# Patient Record
Sex: MALE | URBAN - METROPOLITAN AREA
[De-identification: names, ages, dates, MRNs, and addresses within clinical notes are randomized per-mention and may not be internally consistent; named-entity substitution may affect disease eponyms.]

---

## 2021-06-10 ENCOUNTER — OFFICE VISIT (OUTPATIENT)
Dept: URBAN - METROPOLITAN AREA CLINIC 71 | Facility: CLINIC | Age: 76
End: 2021-06-10
Payer: COMMERCIAL

## 2021-06-10 DIAGNOSIS — H52.4 PRESBYOPIA: Primary | ICD-10-CM

## 2021-06-10 PROCEDURE — 92004 COMPRE OPH EXAM NEW PT 1/>: CPT | Performed by: OPTOMETRIST

## 2021-06-10 ASSESSMENT — VISUAL ACUITY
OD: 20/25
OS: 20/80

## 2021-06-10 ASSESSMENT — INTRAOCULAR PRESSURE
OS: 12
OD: 13

## 2021-06-10 NOTE — IMPRESSION/PLAN
Impression: Presbyopia: H52.4. Plan: Presbyopia is the inability to focus on objects (ie: accommodate) due to the loss of flexibility of your natural lens. Presbyopia occurs with age. Reading glasses, bifocals, trifocals or contacts can be helpful. Contact the office if difficulty focusing persists despite corrective eye wear. New glasses for progressives and NVO given today. Continue to follow annually for vision exams.

## 2022-01-04 ENCOUNTER — OFFICE VISIT (OUTPATIENT)
Dept: URBAN - METROPOLITAN AREA CLINIC 71 | Facility: CLINIC | Age: 77
End: 2022-01-04
Payer: COMMERCIAL

## 2022-01-04 DIAGNOSIS — H43.813 VITREOUS DEGENERATION, BILATERAL: ICD-10-CM

## 2022-01-04 DIAGNOSIS — H35.3111 NONEXUDATIVE AGE-RELATED MACULAR DEGENERATION OF RIGHT EYE, EARLY DRY STAGE: Primary | ICD-10-CM

## 2022-01-04 DIAGNOSIS — H25.13 AGE-RELATED NUCLEAR CATARACT, BILATERAL: ICD-10-CM

## 2022-01-04 DIAGNOSIS — H35.349 MACULAR HOLE: ICD-10-CM

## 2022-01-04 PROCEDURE — 99214 OFFICE O/P EST MOD 30 MIN: CPT | Performed by: OPTOMETRIST

## 2022-01-04 PROCEDURE — 92134 CPTRZ OPH DX IMG PST SGM RTA: CPT | Performed by: OPTOMETRIST

## 2022-01-04 ASSESSMENT — INTRAOCULAR PRESSURE
OS: 12
OD: 10

## 2022-01-04 NOTE — IMPRESSION/PLAN
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 Plan: OCT ordered and performed today. There are no specific modalities proven to be effective to treat drusen. A diet rich in green vegetables is recommended. It is important to follow the amsler grid and report any changes such as increase in distortion or blurring of lines. If drusen progress they can cause decrease in vision or distortion of vision. Avoidance of smoking is recommended. Contact the office if you experience decrease in vision, blurred vision, distoriton in vision or concerning changes in vision. Take eye vitamins and check amsler grid daily.

## 2022-01-04 NOTE — IMPRESSION/PLAN
Impression: Vitreous degeneration, bilateral: H43.813. Stable. No holes or tears observed on today's exam. Plan: There is no evidence of retinal pathology. All signs and risks of retinal detachment and tears were discussed in detail. Patient instructed to call the office immediately if any symptoms noted.

## 2022-01-04 NOTE — IMPRESSION/PLAN
Impression: Macular hole: H35.349. Left. Old, pt reports 4+ years. Plan: Discussed in detail with pt. Continue to monitor. Call with new symptoms or concerns.

## 2022-07-07 ENCOUNTER — OFFICE VISIT (OUTPATIENT)
Dept: URBAN - METROPOLITAN AREA CLINIC 71 | Facility: CLINIC | Age: 77
End: 2022-07-07
Payer: COMMERCIAL

## 2022-07-07 DIAGNOSIS — H25.13 AGE-RELATED NUCLEAR CATARACT, BILATERAL: ICD-10-CM

## 2022-07-07 DIAGNOSIS — H35.349 MACULAR HOLE: ICD-10-CM

## 2022-07-07 DIAGNOSIS — H35.3131 NONEXUDATIVE AGE-RELATED MACULAR DEGENERATION, BILATERAL, EARLY DRY STAGE: Primary | ICD-10-CM

## 2022-07-07 DIAGNOSIS — H52.4 PRESBYOPIA: ICD-10-CM

## 2022-07-07 PROCEDURE — 99214 OFFICE O/P EST MOD 30 MIN: CPT | Performed by: OPTOMETRIST

## 2022-07-07 PROCEDURE — 92134 CPTRZ OPH DX IMG PST SGM RTA: CPT | Performed by: OPTOMETRIST

## 2022-07-07 ASSESSMENT — INTRAOCULAR PRESSURE
OS: 17
OD: 17

## 2022-07-07 ASSESSMENT — VISUAL ACUITY
OD: 20/40
OS: 20/150

## 2022-07-07 ASSESSMENT — KERATOMETRY
OD: 40.00
OS: 40.63

## 2022-07-07 NOTE — IMPRESSION/PLAN
Impression: Presbyopia: H52.4. Plan: Presbyopia is the inability to focus on objects (ie: accommodate) due to the loss of flexibility of your natural lens. Presbyopia occurs with age. Reading glasses, bifocals, trifocals or contacts can be helpful. Contact the office if difficulty focusing persists despite corrective eye wear. New glasses RX given today for NVO. Discussed there is not much improvement with updating the glasses but there has been a slight change in the prescription.

## 2022-07-07 NOTE — IMPRESSION/PLAN
Impression: Macular hole: H35.349 Left. Plan: Macular hole w/ Microcystic edema noted on exam and OCT today. There has been a decline in his acuity from 20/80 to 20/400. Referring PT to retina for evaluation.

## 2022-08-11 ENCOUNTER — OFFICE VISIT (OUTPATIENT)
Facility: LOCATION | Age: 77
End: 2022-08-11
Payer: COMMERCIAL

## 2022-08-11 DIAGNOSIS — H17.9 CORNEAL SCAR: ICD-10-CM

## 2022-08-11 DIAGNOSIS — H43.813 VITREOUS DEGENERATION, BILATERAL: ICD-10-CM

## 2022-08-11 DIAGNOSIS — H25.13 AGE-RELATED NUCLEAR CATARACT, BILATERAL: ICD-10-CM

## 2022-08-11 DIAGNOSIS — H35.342 MACULAR CYST, HOLE, OR PSEUDOHOLE, LEFT EYE: Primary | ICD-10-CM

## 2022-08-11 DIAGNOSIS — H35.3131 NONEXUDATIVE AGE-RELATED MACULAR DEGENERATION, BILATERAL, EARLY DRY STAGE: ICD-10-CM

## 2022-08-11 PROCEDURE — 99204 OFFICE O/P NEW MOD 45 MIN: CPT | Performed by: OPHTHALMOLOGY

## 2022-08-11 PROCEDURE — 92134 CPTRZ OPH DX IMG PST SGM RTA: CPT | Performed by: OPHTHALMOLOGY

## 2022-08-11 ASSESSMENT — INTRAOCULAR PRESSURE
OS: 14
OD: 15

## 2022-08-11 NOTE — IMPRESSION/PLAN
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 OCT OU - no IRF/SRF OU, FTMH OS  / 248 Plan: Onset per patient was 5 years ago Has chronic pigment surrounding MH Given chronicity, unlikely to benefit from surgery, but could try RBA's d/w patient in detail 12m OCT OU with us or Dr. Joshua Rehman

## 2022-08-11 NOTE — IMPRESSION/PLAN
Impression: Nonexudative age-related macular degeneration, bilateral, early dry stage: H35.3131. Plan: Discussed diagnosis with patient. No smoking. Pt to take vitamins approved in the AREDS2 study & continue to monitor AG on a daily basis. If any changes w/ AG call and RTC ASAP.

## 2023-07-18 ENCOUNTER — OFFICE VISIT (OUTPATIENT)
Dept: URBAN - METROPOLITAN AREA CLINIC 71 | Facility: CLINIC | Age: 78
End: 2023-07-18
Payer: MEDICARE

## 2023-07-18 DIAGNOSIS — H25.13 AGE-RELATED NUCLEAR CATARACT, BILATERAL: ICD-10-CM

## 2023-07-18 DIAGNOSIS — H52.4 PRESBYOPIA: ICD-10-CM

## 2023-07-18 DIAGNOSIS — H35.342 MACULAR CYST, HOLE, OR PSEUDOHOLE, LEFT EYE: Primary | ICD-10-CM

## 2023-07-18 DIAGNOSIS — H35.3131 NONEXUDATIVE AGE-RELATED MACULAR DEGENERATION, BILATERAL, EARLY DRY STAGE: ICD-10-CM

## 2023-07-18 DIAGNOSIS — H17.9 CORNEAL SCAR: ICD-10-CM

## 2023-07-18 DIAGNOSIS — H43.813 VITREOUS DEGENERATION, BILATERAL: ICD-10-CM

## 2023-07-18 PROCEDURE — 99213 OFFICE O/P EST LOW 20 MIN: CPT | Performed by: OPTOMETRIST

## 2023-07-18 PROCEDURE — 92134 CPTRZ OPH DX IMG PST SGM RTA: CPT | Performed by: OPTOMETRIST

## 2023-07-18 ASSESSMENT — INTRAOCULAR PRESSURE
OD: 14
OS: 16

## 2023-07-18 ASSESSMENT — VISUAL ACUITY
OD: 20/25
OS: 20/400

## 2023-07-18 NOTE — IMPRESSION/PLAN
Impression: Age-related nuclear cataract, bilateral: H25.13. Plan: No treatment recommended at this time, recommend waiting as long as possible due to OD being only eye that sees well.  Will continue to monitor

## 2023-07-18 NOTE — IMPRESSION/PLAN
Impression: Nonexudative age-related macular degeneration, bilateral, early dry stage: H35.3131. OCT 7/18/2023: NO IRF/SRF Plan: Discussed. Continue taking eye vitamins and checking amsler grid. Contact office if any changes in vision.  Will continue to monitor annually

## 2023-07-18 NOTE — IMPRESSION/PLAN
Impression: Vitreous degeneration, bilateral: H43.813. Plan: Contact office if any new or worsening symptoms.  Monitor annually

## 2023-07-18 NOTE — IMPRESSION/PLAN
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 OCT 7/18/2023: Mac Hole stable OD
previously evaluated by Dr Nayana Garcia: Discussed. Holding off on surgical treatment at this time. Contact office if any changes or new distortion in vision.  Will continue to monitor annually with DE and OCT

## 2023-07-18 NOTE — IMPRESSION/PLAN
Impression: Presbyopia: H52.4. Plan: New glasses Rx given today.  Pt advised of limitations to visual improvement OS due to macular issues

## 2024-07-30 ENCOUNTER — OFFICE VISIT (OUTPATIENT)
Dept: URBAN - METROPOLITAN AREA CLINIC 71 | Facility: CLINIC | Age: 79
End: 2024-07-30
Payer: MEDICARE

## 2024-07-30 DIAGNOSIS — H35.3131 NONEXUDATIVE AGE-RELATED MACULAR DEGENERATION, BILATERAL, EARLY DRY STAGE: ICD-10-CM

## 2024-07-30 DIAGNOSIS — H17.9 CORNEAL SCAR: ICD-10-CM

## 2024-07-30 DIAGNOSIS — H43.813 VITREOUS DEGENERATION, BILATERAL: ICD-10-CM

## 2024-07-30 DIAGNOSIS — H52.4 PRESBYOPIA: ICD-10-CM

## 2024-07-30 DIAGNOSIS — H25.13 AGE-RELATED NUCLEAR CATARACT, BILATERAL: ICD-10-CM

## 2024-07-30 DIAGNOSIS — H35.342 MACULAR CYST, HOLE, OR PSEUDOHOLE, LEFT EYE: Primary | ICD-10-CM

## 2024-07-30 PROCEDURE — 92134 CPTRZ OPH DX IMG PST SGM RTA: CPT | Performed by: OPTOMETRIST

## 2024-07-30 PROCEDURE — 99213 OFFICE O/P EST LOW 20 MIN: CPT | Performed by: OPTOMETRIST

## 2024-07-30 PROCEDURE — 92133 CPTRZD OPH DX IMG PST SGM ON: CPT | Performed by: OPTOMETRIST

## 2024-07-30 ASSESSMENT — VISUAL ACUITY
OS: 20/150
OD: 20/25

## 2024-07-30 ASSESSMENT — INTRAOCULAR PRESSURE
OD: 15
OS: 20

## 2025-08-06 ENCOUNTER — OFFICE VISIT (OUTPATIENT)
Dept: URBAN - METROPOLITAN AREA CLINIC 71 | Facility: CLINIC | Age: 80
End: 2025-08-06
Payer: MEDICARE

## 2025-08-06 DIAGNOSIS — H35.342 MACULAR CYST, HOLE, OR PSEUDOHOLE, LEFT EYE: ICD-10-CM

## 2025-08-06 DIAGNOSIS — H35.3131 NONEXUDATIVE AGE-RELATED MACULAR DEGENERATION, BILATERAL, EARLY DRY STAGE: Primary | ICD-10-CM

## 2025-08-06 DIAGNOSIS — H52.4 PRESBYOPIA: ICD-10-CM

## 2025-08-06 PROCEDURE — 99213 OFFICE O/P EST LOW 20 MIN: CPT | Performed by: OPTOMETRIST

## 2025-08-06 PROCEDURE — 92134 CPTRZ OPH DX IMG PST SGM RTA: CPT | Performed by: OPTOMETRIST

## 2025-08-06 PROCEDURE — 92133 CPTRZD OPH DX IMG PST SGM ON: CPT | Performed by: OPTOMETRIST

## 2025-08-06 ASSESSMENT — VISUAL ACUITY
OS: 20/150
OD: 20/25

## 2025-08-06 ASSESSMENT — INTRAOCULAR PRESSURE
OD: 14
OS: 16